# Patient Record
Sex: FEMALE | Race: WHITE | NOT HISPANIC OR LATINO | Employment: STUDENT | URBAN - METROPOLITAN AREA
[De-identification: names, ages, dates, MRNs, and addresses within clinical notes are randomized per-mention and may not be internally consistent; named-entity substitution may affect disease eponyms.]

---
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
## 2024-04-12 ENCOUNTER — VBI (OUTPATIENT)
Dept: ADMINISTRATIVE | Facility: OTHER | Age: 19
End: 2024-04-12

## 2024-04-12 NOTE — TELEPHONE ENCOUNTER
04/12/24 10:18 AM     [employer] SmartForm used to document caregap status.    Kasandra Johnson MA

## 2024-06-20 ENCOUNTER — OFFICE VISIT (OUTPATIENT)
Dept: INTERNAL MEDICINE CLINIC | Age: 19
End: 2024-06-20
Payer: COMMERCIAL

## 2024-06-20 ENCOUNTER — APPOINTMENT (OUTPATIENT)
Dept: LAB | Age: 19
End: 2024-06-20
Payer: COMMERCIAL

## 2024-06-20 VITALS
OXYGEN SATURATION: 98 % | HEIGHT: 63 IN | DIASTOLIC BLOOD PRESSURE: 66 MMHG | TEMPERATURE: 98 F | SYSTOLIC BLOOD PRESSURE: 112 MMHG | WEIGHT: 122.5 LBS | HEART RATE: 77 BPM | BODY MASS INDEX: 21.71 KG/M2

## 2024-06-20 DIAGNOSIS — Z20.2 EXPOSURE TO SEXUALLY TRANSMITTED DISEASE (STD): ICD-10-CM

## 2024-06-20 DIAGNOSIS — F31.81 BIPOLAR 2 DISORDER (HCC): ICD-10-CM

## 2024-06-20 DIAGNOSIS — Z00.00 ANNUAL PHYSICAL EXAM: ICD-10-CM

## 2024-06-20 DIAGNOSIS — Z11.3 SCREEN FOR STD (SEXUALLY TRANSMITTED DISEASE): ICD-10-CM

## 2024-06-20 DIAGNOSIS — Z00.00 ANNUAL PHYSICAL EXAM: Primary | ICD-10-CM

## 2024-06-20 DIAGNOSIS — N91.2 AMENORRHEA, UNSPECIFIED: ICD-10-CM

## 2024-06-20 DIAGNOSIS — J06.9 URTI (ACUTE UPPER RESPIRATORY INFECTION): ICD-10-CM

## 2024-06-20 LAB
ALBUMIN SERPL BCG-MCNC: 4.8 G/DL (ref 3.5–5)
ALP SERPL-CCNC: 65 U/L (ref 34–104)
ALT SERPL W P-5'-P-CCNC: 15 U/L (ref 7–52)
ANION GAP SERPL CALCULATED.3IONS-SCNC: 11 MMOL/L (ref 4–13)
AST SERPL W P-5'-P-CCNC: 17 U/L (ref 13–39)
BASOPHILS # BLD AUTO: 0.05 THOUSANDS/ÂΜL (ref 0–0.1)
BASOPHILS NFR BLD AUTO: 1 % (ref 0–1)
BILIRUB SERPL-MCNC: 0.45 MG/DL (ref 0.2–1)
BUN SERPL-MCNC: 17 MG/DL (ref 5–25)
CALCIUM SERPL-MCNC: 10.2 MG/DL (ref 8.4–10.2)
CHLORIDE SERPL-SCNC: 105 MMOL/L (ref 96–108)
CHOLEST SERPL-MCNC: 151 MG/DL
CO2 SERPL-SCNC: 23 MMOL/L (ref 21–32)
CREAT SERPL-MCNC: 0.74 MG/DL (ref 0.6–1.3)
EOSINOPHIL # BLD AUTO: 0.13 THOUSAND/ÂΜL (ref 0–0.61)
EOSINOPHIL NFR BLD AUTO: 1 % (ref 0–6)
ERYTHROCYTE [DISTWIDTH] IN BLOOD BY AUTOMATED COUNT: 12.2 % (ref 11.6–15.1)
GFR SERPL CREATININE-BSD FRML MDRD: 117 ML/MIN/1.73SQ M
GLUCOSE P FAST SERPL-MCNC: 86 MG/DL (ref 65–99)
HCT VFR BLD AUTO: 41.9 % (ref 34.8–46.1)
HDLC SERPL-MCNC: 53 MG/DL
HGB BLD-MCNC: 14.1 G/DL (ref 11.5–15.4)
IMM GRANULOCYTES # BLD AUTO: 0.22 THOUSAND/UL (ref 0–0.2)
IMM GRANULOCYTES NFR BLD AUTO: 2 % (ref 0–2)
LDLC SERPL CALC-MCNC: 76 MG/DL (ref 0–100)
LYMPHOCYTES # BLD AUTO: 2.77 THOUSANDS/ÂΜL (ref 0.6–4.47)
LYMPHOCYTES NFR BLD AUTO: 26 % (ref 14–44)
MCH RBC QN AUTO: 31.3 PG (ref 26.8–34.3)
MCHC RBC AUTO-ENTMCNC: 33.7 G/DL (ref 31.4–37.4)
MCV RBC AUTO: 93 FL (ref 82–98)
MONOCYTES # BLD AUTO: 0.76 THOUSAND/ÂΜL (ref 0.17–1.22)
MONOCYTES NFR BLD AUTO: 7 % (ref 4–12)
NEUTROPHILS # BLD AUTO: 6.83 THOUSANDS/ÂΜL (ref 1.85–7.62)
NEUTS SEG NFR BLD AUTO: 63 % (ref 43–75)
NONHDLC SERPL-MCNC: 98 MG/DL
NRBC BLD AUTO-RTO: 0 /100 WBCS
PLATELET # BLD AUTO: 378 THOUSANDS/UL (ref 149–390)
PMV BLD AUTO: 10 FL (ref 8.9–12.7)
POTASSIUM SERPL-SCNC: 4.3 MMOL/L (ref 3.5–5.3)
PROT SERPL-MCNC: 7.8 G/DL (ref 6.4–8.4)
RBC # BLD AUTO: 4.5 MILLION/UL (ref 3.81–5.12)
SL AMB POCT URINE HCG: NEGATIVE
SODIUM SERPL-SCNC: 139 MMOL/L (ref 135–147)
TRIGL SERPL-MCNC: 111 MG/DL
TSH SERPL DL<=0.05 MIU/L-ACNC: 2.36 UIU/ML (ref 0.45–4.5)
VALPROATE SERPL-MCNC: <10 UG/ML (ref 50–100)
WBC # BLD AUTO: 10.76 THOUSAND/UL (ref 4.31–10.16)

## 2024-06-20 PROCEDURE — 81025 URINE PREGNANCY TEST: CPT | Performed by: INTERNAL MEDICINE

## 2024-06-20 PROCEDURE — 85025 COMPLETE CBC W/AUTO DIFF WBC: CPT

## 2024-06-20 PROCEDURE — 84443 ASSAY THYROID STIM HORMONE: CPT

## 2024-06-20 PROCEDURE — 80053 COMPREHEN METABOLIC PANEL: CPT

## 2024-06-20 PROCEDURE — 36415 COLL VENOUS BLD VENIPUNCTURE: CPT

## 2024-06-20 PROCEDURE — 99395 PREV VISIT EST AGE 18-39: CPT | Performed by: INTERNAL MEDICINE

## 2024-06-20 PROCEDURE — 87389 HIV-1 AG W/HIV-1&-2 AB AG IA: CPT

## 2024-06-20 PROCEDURE — 99214 OFFICE O/P EST MOD 30 MIN: CPT | Performed by: INTERNAL MEDICINE

## 2024-06-20 PROCEDURE — 87340 HEPATITIS B SURFACE AG IA: CPT

## 2024-06-20 PROCEDURE — 86696 HERPES SIMPLEX TYPE 2 TEST: CPT

## 2024-06-20 PROCEDURE — 86803 HEPATITIS C AB TEST: CPT

## 2024-06-20 PROCEDURE — 80165 DIPROPYLACETIC ACID FREE: CPT

## 2024-06-20 PROCEDURE — 80164 ASSAY DIPROPYLACETIC ACD TOT: CPT

## 2024-06-20 PROCEDURE — 86695 HERPES SIMPLEX TYPE 1 TEST: CPT

## 2024-06-20 PROCEDURE — 80061 LIPID PANEL: CPT

## 2024-06-20 PROCEDURE — 86780 TREPONEMA PALLIDUM: CPT

## 2024-06-20 RX ORDER — AMOXICILLIN AND CLAVULANATE POTASSIUM 875; 125 MG/1; MG/1
1 TABLET, FILM COATED ORAL EVERY 12 HOURS SCHEDULED
Qty: 14 TABLET | Refills: 0 | Status: SHIPPED | OUTPATIENT
Start: 2024-06-20 | End: 2024-06-27

## 2024-06-20 NOTE — PROGRESS NOTES
Assessment/Plan:    Exposure to STDs/STD screening  -We will order an HIV test, RPR/syphilis, herpes 1 and 2 IgG, hepatitis B surface antigen and hepatitis C antibody test and follow-up with the results  -Will also order GC chlamydia and urine and follow-up with the results    Upper respiratory tract infection  - likely viral with bacterial superinfection vs bacterial  - we will start pt on Augmentin 875-125 mg twice daily for 7 days, and she may continue to use over-the-counter  Mucinex for productive cough  - Pt may use OTC tylenol or ibuprofen with meals for any aches and pains, warm salt water gargles for any sore throat and was encouraged to drink lots of fluids, get plenty of rest and wash her hands liberally.  - pt was also counseled to take antibiotics with food and also to use a probiotic like yogurt daily as long as she is taking antibiotics  -  She should return to the clinic if her symptoms worsen or do not improve.    Bipolar 2 disorder  -Currently following with a virtual psychiatrist  -We will order a Depakote level as requested  -Follow-up with psychiatrist for proper treatment     Diagnoses and all orders for this visit:    Annual physical exam  -     CBC and differential; Future  -     TSH, 3rd generation with Free T4 reflex; Future  -     Comprehensive metabolic panel; Future  -     Lipid panel; Future    Screen for STD (sexually transmitted disease)  -     HIV 1/2 AG/AB w Reflex [facility] for 2 yr old and above; Future  -     Hepatitis C antibody; Future  -     Hepatitis B surface antigen; Future  -     RPR-Syphilis Screening (Total Syphilis IGG/IGM); Future  -     Herpes I/II IgG EDITH w Reflex to HSV-2; Future  -     Chlamydia/GC amplified DNA by PCR; Future    Exposure to sexually transmitted disease (STD)  -     HIV 1/2 AG/AB w Reflex [facility] for 2 yr old and above; Future  -     Hepatitis C antibody; Future  -     Hepatitis B surface antigen; Future  -     RPR-Syphilis Screening (Total Syphilis  IGG/IGM); Future  -     Herpes I/II IgG EDITH w Reflex to HSV-2; Future  -     Chlamydia/GC amplified DNA by PCR; Future    URTI (acute upper respiratory infection)  -     amoxicillin-clavulanate (AUGMENTIN) 875-125 mg per tablet; Take 1 tablet by mouth every 12 (twelve) hours for 7 days    Amenorrhea, unspecified  -     POCT urine HCG    Bipolar 2 disorder (HCC)  -     Valproic acid level, free; Future  -     Valproic acid level, total; Future          Subjective:      Patient ID: Jose R Bravo is a 18 y.o. female.    HPI    Today, patient states that she would like to be tested for STDs- hiv, hep c - std profile  She states that her psychiatrist, whom she sees virtually, needs cbc, cmp and depakote level.  Of note, she used to be on Depakote but has not taken it in 1 year.  She needs learner's permit form completed.  She will have an eye exam done at her optometrist cos she is scheduled for her yearly exam.  She wears glasses but did not bring her glasses today.  Sees an online psychiatirst - last seen about 6  months ago   She states that with regards to her bipolar 2 disorder, she usually has mood swngs and is not taking her meds now cos she could not get more till she had her blood work done.  Occasionally gets angry or sad , and states that her bipolar 2 is mostly mixed and when she gets hyper, she would do drugs and engage in unsafe sexual behavior.  She also complains that she has been sick for the past month or two   Initially had fevers and body aches but that has resolved and she now has a persistent productive cough.  She admits to nasal congestion, sinus pressure, occasional shortness of breath when she coughs a lot, occasional wheezing, occasional chest pain when she coughs a lot, oligomenorrhea.   + hx of contact - girlfriend was sick    She denies any fever, chills, night sweats, headache, dizziness, runny nose, pnd, sore throat, ear ache, sinus pain, palpitations, nausea, vomiting, diarrhea,  constipation, hematochezia, hematuria, melena stools, arthralgias, myalgias or insomnia.  The following portions of the patient's history were reviewed and updated as appropriate: She  has a past medical history of Anxiety, Bipolar 1 disorder (HCC), Depression, and Mood swings.  She   Patient Active Problem List    Diagnosis Date Noted   • Annual physical exam 06/20/2024   • Screen for STD (sexually transmitted disease) 06/20/2024   • Exposure to sexually transmitted disease (STD) 06/20/2024   • URTI (acute upper respiratory infection) 06/20/2024   • Amenorrhea, unspecified 06/20/2024   • Chronic bilateral low back pain without sciatica 03/03/2023   • Scoliosis of lumbar spine 03/03/2023   • Bipolar 2 disorder (HCC) 03/03/2023   • Post-nasal drip 03/03/2023     She  has no past surgical history on file.  Her Family history is unknown by patient.  She  reports that she has been smoking cigarettes. She started smoking about 2 years ago. She has a 0.4 pack-year smoking history. She has never used smokeless tobacco. She reports current alcohol use. She reports that she does not use drugs.  Current Outpatient Medications   Medication Sig Dispense Refill   • amoxicillin-clavulanate (AUGMENTIN) 875-125 mg per tablet Take 1 tablet by mouth every 12 (twelve) hours for 7 days 14 tablet 0     No current facility-administered medications for this visit.     No current outpatient medications on file prior to visit.     No current facility-administered medications on file prior to visit.     She has No Known Allergies..    Review of Systems   Constitutional:  Negative for activity change, chills, fatigue, fever and unexpected weight change.   HENT:  Positive for congestion and sinus pressure. Negative for ear pain, postnasal drip, rhinorrhea and sore throat.    Eyes:  Negative for pain.   Respiratory:  Positive for cough, shortness of breath and wheezing. Negative for choking and chest tightness.    Cardiovascular:  Positive for  "chest pain. Negative for palpitations and leg swelling.   Gastrointestinal:  Negative for abdominal pain, constipation, diarrhea, nausea and vomiting.   Genitourinary:  Positive for menstrual problem (Oligomenorrhea). Negative for dysuria and hematuria.   Musculoskeletal:  Negative for arthralgias, back pain, gait problem, joint swelling, myalgias and neck stiffness.   Skin:  Negative for pallor and rash.   Neurological:  Negative for dizziness, tremors, seizures, syncope, light-headedness and headaches.   Hematological:  Negative for adenopathy.   Psychiatric/Behavioral:  Positive for dysphoric mood. Negative for behavioral problems. The patient is nervous/anxious.          Objective:      /66 (BP Location: Left arm, Patient Position: Sitting, Cuff Size: Standard)   Pulse 77   Temp 98 °F (36.7 °C) (Temporal)   Ht 5' 3\" (1.6 m)   Wt 55.6 kg (122 lb 8 oz)   SpO2 98%   BMI 21.70 kg/m²          Physical Exam  Constitutional:       General: She is not in acute distress.     Appearance: She is well-developed. She is not diaphoretic.   HENT:      Head: Normocephalic and atraumatic.      Right Ear: External ear normal.      Left Ear: External ear normal.      Nose: Mucosal edema (Ethmoidal sinus tenderness) present.      Mouth/Throat:      Mouth: Mucous membranes are dry.      Pharynx: Posterior oropharyngeal erythema (Oropharyngeal erythema with dry mucous membranes) present. No oropharyngeal exudate.   Eyes:      General: No scleral icterus.        Right eye: No discharge.         Left eye: No discharge.      Conjunctiva/sclera: Conjunctivae normal.      Pupils: Pupils are equal, round, and reactive to light.   Neck:      Thyroid: No thyromegaly.      Vascular: No JVD.      Trachea: No tracheal deviation.   Cardiovascular:      Rate and Rhythm: Normal rate and regular rhythm.      Heart sounds: Normal heart sounds. No murmur heard.     No friction rub. No gallop.   Pulmonary:      Effort: Pulmonary effort is " normal. No respiratory distress.      Breath sounds: Normal breath sounds. No wheezing or rales.   Chest:      Chest wall: No tenderness.   Abdominal:      General: Bowel sounds are normal. There is no distension.      Palpations: Abdomen is soft. There is no mass.      Tenderness: There is no abdominal tenderness. There is no guarding or rebound.   Musculoskeletal:         General: No tenderness or deformity. Normal range of motion.      Cervical back: Normal range of motion and neck supple.   Lymphadenopathy:      Cervical: No cervical adenopathy.   Skin:     General: Skin is warm and dry.      Coloration: Skin is not pale.      Findings: No erythema or rash.      Comments: Multiple well-healed linear scars on the bilateral forearms   Neurological:      Mental Status: She is alert and oriented to person, place, and time.      Cranial Nerves: No cranial nerve deficit.      Motor: No abnormal muscle tone.      Coordination: Coordination normal.      Deep Tendon Reflexes: Reflexes are normal and symmetric.   Psychiatric:         Behavior: Behavior normal.           Appointment on 06/20/2024   Component Date Value Ref Range Status   • WBC 06/20/2024 10.76 (H)  4.31 - 10.16 Thousand/uL Final   • RBC 06/20/2024 4.50  3.81 - 5.12 Million/uL Final   • Hemoglobin 06/20/2024 14.1  11.5 - 15.4 g/dL Final   • Hematocrit 06/20/2024 41.9  34.8 - 46.1 % Final   • MCV 06/20/2024 93  82 - 98 fL Final   • MCH 06/20/2024 31.3  26.8 - 34.3 pg Final   • MCHC 06/20/2024 33.7  31.4 - 37.4 g/dL Final   • RDW 06/20/2024 12.2  11.6 - 15.1 % Final   • MPV 06/20/2024 10.0  8.9 - 12.7 fL Final   • Platelets 06/20/2024 378  149 - 390 Thousands/uL Final   • nRBC 06/20/2024 0  /100 WBCs Final   • Segmented % 06/20/2024 63  43 - 75 % Final   • Immature Grans % 06/20/2024 2  0 - 2 % Final   • Lymphocytes % 06/20/2024 26  14 - 44 % Final   • Monocytes % 06/20/2024 7  4 - 12 % Final   • Eosinophils Relative 06/20/2024 1  0 - 6 % Final   • Basophils  Relative 06/20/2024 1  0 - 1 % Final   • Absolute Neutrophils 06/20/2024 6.83  1.85 - 7.62 Thousands/µL Final   • Absolute Immature Grans 06/20/2024 0.22 (H)  0.00 - 0.20 Thousand/uL Final   • Absolute Lymphocytes 06/20/2024 2.77  0.60 - 4.47 Thousands/µL Final   • Absolute Monocytes 06/20/2024 0.76  0.17 - 1.22 Thousand/µL Final   • Eosinophils Absolute 06/20/2024 0.13  0.00 - 0.61 Thousand/µL Final   • Basophils Absolute 06/20/2024 0.05  0.00 - 0.10 Thousands/µL Final   • TSH 3RD GENERATON 06/20/2024 2.359  0.450 - 4.500 uIU/mL Final    The recommended reference ranges for TSH during pregnancy are as follows:   First trimester 0.100 to 2.500 uIU/mL   Second trimester  0.200 to 3.000 uIU/mL   Third trimester 0.300 to 3.000 uIU/m    Note: Normal ranges may not apply to patients who are transgender, non-binary, or whose legal sex, sex at birth, and gender identity differ.  Adult TSH (3rd generation) reference range follows the recommended guidelines of the American Thyroid Association, January, 2020.   • Sodium 06/20/2024 139  135 - 147 mmol/L Final   • Potassium 06/20/2024 4.3  3.5 - 5.3 mmol/L Final   • Chloride 06/20/2024 105  96 - 108 mmol/L Final   • CO2 06/20/2024 23  21 - 32 mmol/L Final   • ANION GAP 06/20/2024 11  4 - 13 mmol/L Final   • BUN 06/20/2024 17  5 - 25 mg/dL Final   • Creatinine 06/20/2024 0.74  0.60 - 1.30 mg/dL Final    Standardized to IDMS reference method   • Glucose, Fasting 06/20/2024 86  65 - 99 mg/dL Final   • Calcium 06/20/2024 10.2  8.4 - 10.2 mg/dL Final   • AST 06/20/2024 17  13 - 39 U/L Final   • ALT 06/20/2024 15  7 - 52 U/L Final    Specimen collection should occur prior to Sulfasalazine administration due to the potential for falsely depressed results.    • Alkaline Phosphatase 06/20/2024 65  34 - 104 U/L Final   • Total Protein 06/20/2024 7.8  6.4 - 8.4 g/dL Final   • Albumin 06/20/2024 4.8  3.5 - 5.0 g/dL Final   • Total Bilirubin 06/20/2024 0.45  0.20 - 1.00 mg/dL Final    Use  of this assay is not recommended for patients undergoing treatment with eltrombopag due to the potential for falsely elevated results.  N-acetyl-p-benzoquinone imine (metabolite of Acetaminophen) will generate erroneously low results in samples for patients that have taken an overdose of Acetaminophen.   • eGFR 06/20/2024 117  ml/min/1.73sq m Final   • Cholesterol 06/20/2024 151  See Comment mg/dL Final    Cholesterol:         Pediatric <18 Years        Desirable          <170 mg/dL      Borderline High    170-199 mg/dL      High               >=200 mg/dL        Adult >=18 Years            Desirable         <200 mg/dL      Borderline High   200-239 mg/dL      High              >239 mg/dL     • Triglycerides 06/20/2024 111  See Comment mg/dL Final    Triglyceride:     0-9Y            <75mg/dL     10Y-17Y         <90 mg/dL       >=18Y     Normal          <150 mg/dL     Borderline High 150-199 mg/dL     High            200-499 mg/dL        Very High       >499 mg/dL    Specimen collection should occur prior to Metamizole administration due to the potential for falsely depressed results.   • HDL, Direct 06/20/2024 53  >=50 mg/dL Final   • LDL Calculated 06/20/2024 76  0 - 100 mg/dL Final    LDL Cholesterol:     Optimal           <100 mg/dl     Near Optimal      100-129 mg/dl     Above Optimal       Borderline High 130-159 mg/dl       High            160-189 mg/dl       Very High       >189 mg/dl         This screening LDL is a calculated result.   It does not have the accuracy of the Direct Measured LDL in the monitoring of patients with hyperlipidemia and/or statin therapy.   Direct Measure LDL (ZSF491) must be ordered separately in these patients.   • Non-HDL-Chol (CHOL-HDL) 06/20/2024 98  mg/dl Final   • Valproic Acid, Total 06/20/2024 <10 (L)  50 - 100 ug/mL Final   Office Visit on 06/20/2024   Component Date Value Ref Range Status   • URINE HCG 06/20/2024 negative   Final

## 2024-06-20 NOTE — PROGRESS NOTES
Assessment/Plan:    Annual physical examination  - History and physical examination done  - Pt was counseled to eat a heart healthy diet, to drink at least 2 L of water daily, to take a daily multivitamin and to exercise for at least 30 minutes of cardio exercise daily, for at least 5 days a week.  - CBC, CMP, TSH and lipid panel have been ordered and we will follow-up with results.  -She is up-to-date with her Tdap  - follow up in 1 year for an annual physical exam or sooner as needed.  -Will complete her 's license plan as permit form for her as requested       Diagnoses and all orders for this visit:    Annual physical exam  -     CBC and differential; Future  -     TSH, 3rd generation with Free T4 reflex; Future  -     Comprehensive metabolic panel; Future  -     Lipid panel; Future    Screen for STD (sexually transmitted disease)  -     HIV 1/2 AG/AB w Reflex [facility] for 2 yr old and above; Future  -     Hepatitis C antibody; Future  -     Hepatitis B surface antigen; Future  -     RPR-Syphilis Screening (Total Syphilis IGG/IGM); Future  -     Herpes I/II IgG EDITH w Reflex to HSV-2; Future  -     Chlamydia/GC amplified DNA by PCR; Future    Exposure to sexually transmitted disease (STD)  -     HIV 1/2 AG/AB w Reflex [facility] for 2 yr old and above; Future  -     Hepatitis C antibody; Future  -     Hepatitis B surface antigen; Future  -     RPR-Syphilis Screening (Total Syphilis IGG/IGM); Future  -     Herpes I/II IgG EDITH w Reflex to HSV-2; Future  -     Chlamydia/GC amplified DNA by PCR; Future    URTI (acute upper respiratory infection)  -     amoxicillin-clavulanate (AUGMENTIN) 875-125 mg per tablet; Take 1 tablet by mouth every 12 (twelve) hours for 7 days    Amenorrhea, unspecified  -     POCT urine HCG    Bipolar 2 disorder (HCC)  -     Valproic acid level, free; Future  -     Valproic acid level, total; Future          Subjective:      Patient ID: Jose R Bravo is a 18 y.o. female.    hospitals    Patient  presents with her sister for an annual physical exam.    Last annual physical exam- over a year     Past medical history-scoliosis, bipolar 2 disorder, anxiety was seeing a psychiatrist - has not seen them in a year and used depakote      Past surgical history- never    Medications- none    Allergies-NKDA    Diet- mixture of balanced diet and junk food, drinks about 2 L of water daily    Exercise-none    Alcohol use-socially with a  maximum of 4 drinks     Caffeine and soda use- occasional soda     Nicotine use- social smoker for a year and vapes    Recreational drug use-marijuana     Work-none    Sexual history, STD history and HIV testing- not sexually active right now,  normally monogamous with male partner, std hx - never, HIV testing - never    Gynecological history/Prostate health/testicular health history- LMP - ? Last month, irregular and can miss months - has it once every few months, and lasts about 5 days, has never been pregnant,     Colonoscopy-n/a    Immunization history- tdap - 2016    Dental visit- once a year    Vision- myopia - glasses    Family history-  Pacemaker - MGGM  CVA- MGGF    Today, patient states that she would like to be tested for STDs- hiv, hep c - std profile  She states that her psychiatrist, whom she sees virtually, needs cbc, cmp and depakote level.  Of note, she used to be on Depakote but has not taken it in 1 year.  She needs learner's permit form completed.  She will have an eye exam done at her optometrist cos she is scheduled for her yearly exam.  She wears glasses but did not bring her glasses today.  Sees an online psychiatirst - last seen about 6  months ago   She states that with regards to her bipolar 2 disorder, she usually has mood swngs and is not taking her meds now cos she could not get more till she had her blood work done.  Occasionally gets angry or sad , and states that her bipolar 2 is mostly mixed and when she gets hyper, she would do drugs and engage in  unsafe sexual behavior.  She also complains that she has been sick for the past month or two   Initially had fevers and body aches but that has resolved and she now has a persistent productive cough.  She admits to nasal congestion, sinus pressure, occasional shortness of breath when she coughs a lot, occasional wheezing, occasional chest pain when she coughs a lot, oligomenorrhea.   + hx of contact - girlfriend was sick    She denies any fever, chills, night sweats, headache, dizziness, runny nose, pnd, sore throat, ear ache, sinus pain, palpitations, nausea, vomiting, diarrhea, constipation, hematochezia, hematuria, melena stools, arthralgias, myalgias or insomnia.    The following portions of the patient's history were reviewed and updated as appropriate: She  has a past medical history of Anxiety, Bipolar 1 disorder (HCC), Depression, and Mood swings.  She   Patient Active Problem List    Diagnosis Date Noted   • Annual physical exam 06/20/2024   • Screen for STD (sexually transmitted disease) 06/20/2024   • Exposure to sexually transmitted disease (STD) 06/20/2024   • URTI (acute upper respiratory infection) 06/20/2024   • Amenorrhea, unspecified 06/20/2024   • Chronic bilateral low back pain without sciatica 03/03/2023   • Scoliosis of lumbar spine 03/03/2023   • Bipolar 2 disorder (HCC) 03/03/2023   • Post-nasal drip 03/03/2023     She  has no past surgical history on file.  Her Family history is unknown by patient.  She  reports that she has been smoking cigarettes. She started smoking about 2 years ago. She has a 0.4 pack-year smoking history. She has never used smokeless tobacco. She reports current alcohol use. She reports that she does not use drugs.  Current Outpatient Medications   Medication Sig Dispense Refill   • amoxicillin-clavulanate (AUGMENTIN) 875-125 mg per tablet Take 1 tablet by mouth every 12 (twelve) hours for 7 days 14 tablet 0     No current facility-administered medications for this  "visit.     No current outpatient medications on file prior to visit.     No current facility-administered medications on file prior to visit.     She has No Known Allergies..    Review of Systems   Constitutional:  Negative for activity change, chills, fatigue, fever and unexpected weight change.   HENT:  Positive for congestion and sinus pressure (along the nasal brigde). Negative for ear pain, postnasal drip, rhinorrhea and sore throat.    Eyes:  Negative for pain.   Respiratory:  Positive for cough (productive of thick white phlegm), shortness of breath (when she coughs more and exerts herself) and wheezing (occasoinally wheezing when she coughs a lot). Negative for choking and chest tightness.    Cardiovascular:  Positive for chest pain (when she coughs). Negative for palpitations and leg swelling.   Gastrointestinal:  Negative for abdominal pain, constipation, diarrhea, nausea and vomiting.   Genitourinary:  Positive for menstrual problem (irregular menstrual bleeding). Negative for dysuria and hematuria.   Musculoskeletal:  Negative for arthralgias, back pain, gait problem, joint swelling, myalgias and neck stiffness.   Skin:  Negative for pallor and rash.   Neurological:  Negative for dizziness, tremors, seizures, syncope, light-headedness and headaches.   Hematological:  Negative for adenopathy.   Psychiatric/Behavioral:  Positive for dysphoric mood. Negative for behavioral problems and sleep disturbance. The patient is nervous/anxious (low level).          Objective:      /66 (BP Location: Left arm, Patient Position: Sitting, Cuff Size: Standard)   Pulse 77   Temp 98 °F (36.7 °C) (Temporal)   Ht 5' 3\" (1.6 m)   Wt 55.6 kg (122 lb 8 oz)   SpO2 98%   BMI 21.70 kg/m²          Physical Exam  Constitutional:       General: She is not in acute distress.     Appearance: She is well-developed. She is not diaphoretic.   HENT:      Head: Normocephalic and atraumatic.      Right Ear: External ear normal. "      Left Ear: External ear normal. Tympanic membrane is erythematous.      Nose: Mucosal edema (with sphenoidal sinus tenderness on the left) and congestion present.      Mouth/Throat:      Mouth: Mucous membranes are dry.      Pharynx: Posterior oropharyngeal erythema present. No oropharyngeal exudate.   Eyes:      General: No scleral icterus.        Right eye: No discharge.         Left eye: No discharge.      Conjunctiva/sclera: Conjunctivae normal.      Pupils: Pupils are equal, round, and reactive to light.   Neck:      Thyroid: No thyromegaly.      Vascular: No JVD.      Trachea: No tracheal deviation.   Cardiovascular:      Rate and Rhythm: Normal rate and regular rhythm.      Heart sounds: Normal heart sounds. No murmur heard.     No friction rub. No gallop.   Pulmonary:      Effort: Pulmonary effort is normal. No respiratory distress.      Breath sounds: Normal breath sounds. No wheezing or rales.   Chest:      Chest wall: No tenderness.   Abdominal:      General: Bowel sounds are normal. There is no distension.      Palpations: Abdomen is soft. There is no mass.      Tenderness: There is no abdominal tenderness. There is no guarding or rebound.   Musculoskeletal:         General: No tenderness or deformity. Normal range of motion.      Cervical back: Normal range of motion and neck supple.   Lymphadenopathy:      Cervical: No cervical adenopathy.   Skin:     General: Skin is warm and dry.      Coloration: Skin is not pale.      Findings: No erythema or rash.      Comments: Multiple well-healed linear horizontally placed scars on the bilateral forearms   Neurological:      Mental Status: She is alert and oriented to person, place, and time.      Cranial Nerves: No cranial nerve deficit.      Motor: No abnormal muscle tone.      Coordination: Coordination normal.      Deep Tendon Reflexes: Reflexes are normal and symmetric.      Comments: Cranial nerves 2-12 are intact bilaterally  Muscle strength is 5/5 in  all extremities  Sensation is intact in bilateral face and extremities  Rapid alternating movement and finger-to-nose pointing test intact   Deep tendon reflexes are 2+ bilaterally  Gait is intact         Psychiatric:         Behavior: Behavior normal.           Appointment on 06/20/2024   Component Date Value Ref Range Status   • WBC 06/20/2024 10.76 (H)  4.31 - 10.16 Thousand/uL Final   • RBC 06/20/2024 4.50  3.81 - 5.12 Million/uL Final   • Hemoglobin 06/20/2024 14.1  11.5 - 15.4 g/dL Final   • Hematocrit 06/20/2024 41.9  34.8 - 46.1 % Final   • MCV 06/20/2024 93  82 - 98 fL Final   • MCH 06/20/2024 31.3  26.8 - 34.3 pg Final   • MCHC 06/20/2024 33.7  31.4 - 37.4 g/dL Final   • RDW 06/20/2024 12.2  11.6 - 15.1 % Final   • MPV 06/20/2024 10.0  8.9 - 12.7 fL Final   • Platelets 06/20/2024 378  149 - 390 Thousands/uL Final   • nRBC 06/20/2024 0  /100 WBCs Final   • Segmented % 06/20/2024 63  43 - 75 % Final   • Immature Grans % 06/20/2024 2  0 - 2 % Final   • Lymphocytes % 06/20/2024 26  14 - 44 % Final   • Monocytes % 06/20/2024 7  4 - 12 % Final   • Eosinophils Relative 06/20/2024 1  0 - 6 % Final   • Basophils Relative 06/20/2024 1  0 - 1 % Final   • Absolute Neutrophils 06/20/2024 6.83  1.85 - 7.62 Thousands/µL Final   • Absolute Immature Grans 06/20/2024 0.22 (H)  0.00 - 0.20 Thousand/uL Final   • Absolute Lymphocytes 06/20/2024 2.77  0.60 - 4.47 Thousands/µL Final   • Absolute Monocytes 06/20/2024 0.76  0.17 - 1.22 Thousand/µL Final   • Eosinophils Absolute 06/20/2024 0.13  0.00 - 0.61 Thousand/µL Final   • Basophils Absolute 06/20/2024 0.05  0.00 - 0.10 Thousands/µL Final   • TSH 3RD [facility] 06/20/2024 2.359  0.450 - 4.500 uIU/mL Final    The recommended reference ranges for TSH during pregnancy are as follows:   First trimester 0.100 to 2.500 uIU/mL   Second trimester  0.200 to 3.000 uIU/mL   Third trimester 0.300 to 3.000 uIU/m    Note: Normal ranges may not apply to patients who are transgender,  non-binary, or whose legal sex, sex at birth, and gender identity differ.  Adult TSH (3rd generation) reference range follows the recommended guidelines of the American Thyroid Association, January, 2020.   • Sodium 06/20/2024 139  135 - 147 mmol/L Final   • Potassium 06/20/2024 4.3  3.5 - 5.3 mmol/L Final   • Chloride 06/20/2024 105  96 - 108 mmol/L Final   • CO2 06/20/2024 23  21 - 32 mmol/L Final   • ANION GAP 06/20/2024 11  4 - 13 mmol/L Final   • BUN 06/20/2024 17  5 - 25 mg/dL Final   • Creatinine 06/20/2024 0.74  0.60 - 1.30 mg/dL Final    Standardized to IDMS reference method   • Glucose, Fasting 06/20/2024 86  65 - 99 mg/dL Final   • Calcium 06/20/2024 10.2  8.4 - 10.2 mg/dL Final   • AST 06/20/2024 17  13 - 39 U/L Final   • ALT 06/20/2024 15  7 - 52 U/L Final    Specimen collection should occur prior to Sulfasalazine administration due to the potential for falsely depressed results.    • Alkaline Phosphatase 06/20/2024 65  34 - 104 U/L Final   • Total Protein 06/20/2024 7.8  6.4 - 8.4 g/dL Final   • Albumin 06/20/2024 4.8  3.5 - 5.0 g/dL Final   • Total Bilirubin 06/20/2024 0.45  0.20 - 1.00 mg/dL Final    Use of this assay is not recommended for patients undergoing treatment with eltrombopag due to the potential for falsely elevated results.  N-acetyl-p-benzoquinone imine (metabolite of Acetaminophen) will generate erroneously low results in samples for patients that have taken an overdose of Acetaminophen.   • eGFR 06/20/2024 117  ml/min/1.73sq m Final   • Cholesterol 06/20/2024 151  See Comment mg/dL Final    Cholesterol:         Pediatric <18 Years        Desirable          <170 mg/dL      Borderline High    170-199 mg/dL      High               >=200 mg/dL        Adult >=18 Years            Desirable         <200 mg/dL      Borderline High   200-239 mg/dL      High              >239 mg/dL     • Triglycerides 06/20/2024 111  See Comment mg/dL Final    Triglyceride:     0-9Y            <75mg/dL      10Y-17Y         <90 mg/dL       >=18Y     Normal          <150 mg/dL     Borderline High 150-199 mg/dL     High            200-499 mg/dL        Very High       >499 mg/dL    Specimen collection should occur prior to Metamizole administration due to the potential for falsely depressed results.   • HDL, Direct 06/20/2024 53  >=50 mg/dL Final   • LDL Calculated 06/20/2024 76  0 - 100 mg/dL Final    LDL Cholesterol:     Optimal           <100 mg/dl     Near Optimal      100-129 mg/dl     Above Optimal       Borderline High 130-159 mg/dl       High            160-189 mg/dl       Very High       >189 mg/dl         This screening LDL is a calculated result.   It does not have the accuracy of the Direct Measured LDL in the monitoring of patients with hyperlipidemia and/or statin therapy.   Direct Measure LDL (KRD054) must be ordered separately in these patients.   • Non-HDL-Chol (CHOL-HDL) 06/20/2024 98  mg/dl Final   • Valproic Acid, Total 06/20/2024 <10 (L)  50 - 100 ug/mL Final   Office Visit on 06/20/2024   Component Date Value Ref Range Status   • URINE HCG 06/20/2024 negative   Final

## 2024-06-21 LAB
HBV SURFACE AG SER QL: NORMAL
HCV AB SER QL: NORMAL
HIV 1+2 AB+HIV1 P24 AG SERPL QL IA: NORMAL
HIV 2 AB SERPL QL IA: NORMAL
HIV1 AB SERPL QL IA: NORMAL
HIV1 P24 AG SERPL QL IA: NORMAL
TREPONEMA PALLIDUM IGG+IGM AB [PRESENCE] IN SERUM OR PLASMA BY IMMUNOASSAY: NORMAL

## 2024-06-24 LAB — VALPROATE FREE SERPL-MCNC: 1.7 UG/ML (ref 6–22)
